# Patient Record
Sex: MALE | Race: WHITE | Employment: OTHER | URBAN - METROPOLITAN AREA
[De-identification: names, ages, dates, MRNs, and addresses within clinical notes are randomized per-mention and may not be internally consistent; named-entity substitution may affect disease eponyms.]

---

## 2020-01-15 ENCOUNTER — HOSPITAL ENCOUNTER (OUTPATIENT)
Dept: ULTRASOUND IMAGING | Age: 55
Discharge: HOME | End: 2020-01-15
Attending: NURSE PRACTITIONER
Payer: COMMERCIAL

## 2020-01-15 DIAGNOSIS — K40.90 INGUINAL HERNIA: ICD-10-CM

## 2020-01-15 PROCEDURE — 76705 ECHO EXAM OF ABDOMEN: CPT

## 2020-01-23 ENCOUNTER — OFFICE VISIT (OUTPATIENT)
Dept: SURGERY | Age: 55
End: 2020-01-23

## 2020-01-23 VITALS
OXYGEN SATURATION: 95 % | HEART RATE: 84 BPM | HEIGHT: 72 IN | BODY MASS INDEX: 24.52 KG/M2 | WEIGHT: 181 LBS | SYSTOLIC BLOOD PRESSURE: 118 MMHG | DIASTOLIC BLOOD PRESSURE: 76 MMHG | TEMPERATURE: 98.3 F

## 2020-01-23 DIAGNOSIS — K40.20 BILATERAL INGUINAL HERNIA WITHOUT OBSTRUCTION OR GANGRENE, RECURRENCE NOT SPECIFIED: Primary | ICD-10-CM

## 2020-01-23 RX ORDER — BISMUTH SUBSALICYLATE 262 MG
1 TABLET,CHEWABLE ORAL DAILY
COMMUNITY
End: 2021-04-04

## 2020-01-23 NOTE — PROGRESS NOTES
Chief Complaint   Patient presents with    Possible Hernia     Pt seen @ the request of Adriana Molina NP to evaluate possible VH. Discussed advanced directive. Patient states that he does not have an advanced directive.

## 2020-01-23 NOTE — PROGRESS NOTES
HISTORY OF PRESENT ILLNESS  Saima Pfeiffer is a 47 y.o. male. Pain for 3 years left groin  Dull    Had an US 1/15: report reads right femoral hernia, but I reviewed the pictures, looks like the left groin    Appetite good  BMs normal      ____________________________________________________________________________  Patient presents with:  Possible Hernia: Pt seen @ the request of Shay Savage NP to evaluate possible VH. /76   Pulse 84   Temp 98.3 °F (36.8 °C)   Ht 6' (1.829 m)   Wt 82.1 kg (181 lb)   SpO2 95%   BMI 24.55 kg/m²   History reviewed. No pertinent past medical history. Past Surgical History:  No date: HX HEENT      Comment:  tonsilectomy  Social History    Socioeconomic History      Marital status:       Spouse name: Not on file      Number of children: Not on file      Years of education: Not on file      Highest education level: Not on file    Tobacco Use      Smoking status: Never Smoker      Smokeless tobacco: Never Used    Substance and Sexual Activity      Alcohol use: Yes        Comment: social    Review of patient's family history indicates:  Problem: Cancer      Relation: Mother          Age of Onset: (Not Specified)    Current Outpatient Medications:  cholecalciferol, vitamin D3, (VITAMIN D3 PO), Take 5,000 Units by mouth daily. cetirizine HCl (ZYRTEC PO), Take  by mouth.  multivitamin (ONE A DAY) tablet, Take 1 Tab by mouth daily. No current facility-administered medications for this visit. Allergies: No Known Allergies  _____________________________________________________________________________            Possible Hernia   The history is provided by the patient. This is a new problem. The current episode started more than 1 week ago. The problem occurs constantly. The problem has been gradually worsening. Pertinent negatives include no chest pain, no abdominal pain, no headaches and no shortness of breath. The symptoms are aggravated by exertion.  The symptoms are relieved by rest. The treatment provided no relief. Review of Systems   Constitutional: Negative for chills, fever and weight loss. HENT: Negative for ear pain. Eyes: Negative for pain. Respiratory: Negative for shortness of breath. Cardiovascular: Negative for chest pain. Gastrointestinal: Negative for abdominal pain and blood in stool. Genitourinary: Negative for hematuria. Musculoskeletal: Negative for joint pain. Skin: Negative for rash. Neurological: Negative for dizziness, focal weakness, seizures and headaches. Endo/Heme/Allergies: Does not bruise/bleed easily. Psychiatric/Behavioral: The patient does not have insomnia. Physical Exam  Constitutional:       General: He is not in acute distress. Appearance: He is well-developed. He is not diaphoretic. HENT:      Head: Normocephalic and atraumatic. Mouth/Throat:      Pharynx: No oropharyngeal exudate. Eyes:      Pupils: Pupils are equal, round, and reactive to light. Neck:      Musculoskeletal: Normal range of motion. Trachea: No tracheal deviation. Cardiovascular:      Rate and Rhythm: Normal rate and regular rhythm. Heart sounds: Normal heart sounds. No murmur. Pulmonary:      Effort: Pulmonary effort is normal. No respiratory distress. Breath sounds: Normal breath sounds. No wheezing. Abdominal:      General: Bowel sounds are normal. There is no distension. Palpations: Abdomen is soft. There is no mass. Tenderness: There is no tenderness. There is no guarding or rebound. Hernia: A hernia is present. Hernia is present in the right inguinal area and left inguinal area. Genitourinary:     Comments: L>R  Musculoskeletal: Normal range of motion. General: No tenderness. Lymphadenopathy:      Cervical: No cervical adenopathy. Skin:     General: Skin is warm. Findings: No erythema or rash.    Neurological:      Mental Status: He is alert and oriented to person, place, and time. Psychiatric:         Behavior: Behavior normal.         ASSESSMENT and PLAN    ICD-10-CM ICD-9-CM    1. Bilateral inguinal hernia without obstruction or gangrene, recurrence not specified K40.20 550.92        Mio Maurer is having symptoms. I have recommended to him that we proceed with surgery. I had an extensive discussion with him regarding the risks, benefits, and alternatives of proceeding with a Laparoscopic Bilateral Inguinal Hernia Repair with Mesh, Robot Assisted. Risks,benefits, and alternatives were discussed including the risk of anesthesia, bleeding, infection, including mesh infection, chronic orchialgia, neuralgia, other pain syndromes, testicular ischemia, conversion to open, injury to bowel, and recurrence were discussed. He is in agreement to proceed. All questions were answered. Mio Maurer will undergo preoperative evaluation and will proceed provided he is medically stable. We will schedule him at his earliest convenience. Thank you for this consult.

## 2020-01-23 NOTE — LETTER
1/23/2020 2:37 PM 
 
Mr. Beryle North [address] Surgery Instruction Sheet You have been scheduled for surgery on 2/24/20 at 9:15am at [facility]. Please report to the Surgery Center at 7:15am, this is approximately 2 hours prior to your surgery time. The Surgery Center is located on the [address] [place] [address] side of the [facility], just next to the Emergency Room. Reserved parking is available and  parking if lot is full. You will need to have a Pre-op Visit prior to your surgery. Report to the Surgery Center on 2/18/20 at 2:00pm.  Bring a list of medications and your insurance cards with you. You may eat/drink prior to this visit. Call your physician immediately if you notice a change in your health between the time you saw your physician and the day of surgery. If you take a blood thinner, please let us know. Call your ordering Doctor to make sure you can stop taking it prior to your surgery. STOP YOUR ASPIRIN 5 DAYS PRIOR TO SURGERY. DO NOT TAKE  IBUPROFEN, ADVIL, MOTRIN, ALEVE, EXCEDRIN, BC POWDER, GOODIES, FISH OIL OR ANY MEDICATION CONTAINING ASPIRIN 5 DAYS PRIOR TO YOUR SURGERY. MAY TAKE TYLENOL. Eat a light dinner the evening before your surgery. DO NOT EAT OR DRINK ANYTHING AFTER MIDNIGHT THE NIGHT BEFORE YOUR SURGERY. This includes water, chewing gum, lifesavers, etc.  The Pre op nurse will check with you about any medication that you may need to take the morning of surgery. Shower with a new bar of anti-bacterial soap (Dial, Safeguard) or solution given to you by Pre-op, the night before surgery. Do not use lotion, powder, deodorant on the skin after showering.   Wear loose, comfortable clothing the day of surgery and bring a container to store your contacts, eyeglasses, dentures, hearing aid, etc.  Do not bring money, valuables, jewelry, etc. to the hospital.   
 
 If you are having outpatient surgery, someone must come with you the morning of surgery to drive you home. You can not drive for 24 hours after any anesthesia. Sometimes it is necessary to stay overnight and leave the next morning. This is still considered outpatient for most insurance deductibles. Someone will still need to drive you home. If you have questions or concerns, please feel free to call /Aretha at [phone]. If you need to cancel your surgery, please call as soon as possible. Sincerely, Lamond Krabbe, MD

## 2020-01-29 ENCOUNTER — TELEPHONE (OUTPATIENT)
Dept: SURGERY | Age: 55
End: 2020-01-29

## 2020-02-18 ENCOUNTER — HOSPITAL ENCOUNTER (OUTPATIENT)
Dept: PREADMISSION TESTING | Age: 55
Discharge: HOME | End: 2020-02-18
Payer: COMMERCIAL

## 2020-02-18 VITALS
OXYGEN SATURATION: 98 % | WEIGHT: 181.44 LBS | HEIGHT: 72 IN | RESPIRATION RATE: 16 BRPM | DIASTOLIC BLOOD PRESSURE: 79 MMHG | TEMPERATURE: 98.3 F | BODY MASS INDEX: 24.58 KG/M2 | SYSTOLIC BLOOD PRESSURE: 129 MMHG | HEART RATE: 64 BPM

## 2020-02-18 LAB
ANION GAP SERPL CALC-SCNC: 3 MMOL/L (ref 5–15)
ATRIAL RATE: 65 BPM
BUN SERPL-MCNC: 8 MG/DL (ref 6–20)
BUN/CREAT SERPL: 8 (ref 12–20)
CALCIUM SERPL-MCNC: 9.4 MG/DL (ref 8.5–10.1)
CALCULATED P AXIS, ECG09: 79 DEGREES
CALCULATED R AXIS, ECG10: 89 DEGREES
CALCULATED T AXIS, ECG11: 69 DEGREES
CHLORIDE SERPL-SCNC: 106 MMOL/L (ref 97–108)
CO2 SERPL-SCNC: 32 MMOL/L (ref 21–32)
CREAT SERPL-MCNC: 0.97 MG/DL (ref 0.7–1.3)
DIAGNOSIS, 93000: NORMAL
ERYTHROCYTE [DISTWIDTH] IN BLOOD BY AUTOMATED COUNT: 12.2 % (ref 11.5–14.5)
GLUCOSE SERPL-MCNC: 96 MG/DL (ref 65–100)
HCT VFR BLD AUTO: 45.5 % (ref 36.6–50.3)
HGB BLD-MCNC: 15.4 G/DL (ref 12.1–17)
MCH RBC QN AUTO: 32.2 PG (ref 26–34)
MCHC RBC AUTO-ENTMCNC: 33.8 G/DL (ref 30–36.5)
MCV RBC AUTO: 95 FL (ref 80–99)
NRBC # BLD: 0 K/UL (ref 0–0.01)
NRBC BLD-RTO: 0 PER 100 WBC
P-R INTERVAL, ECG05: 172 MS
PLATELET # BLD AUTO: 264 K/UL (ref 150–400)
PMV BLD AUTO: 10.5 FL (ref 8.9–12.9)
POTASSIUM SERPL-SCNC: 4.1 MMOL/L (ref 3.5–5.1)
Q-T INTERVAL, ECG07: 380 MS
QRS DURATION, ECG06: 94 MS
QTC CALCULATION (BEZET), ECG08: 395 MS
RBC # BLD AUTO: 4.79 M/UL (ref 4.1–5.7)
SODIUM SERPL-SCNC: 141 MMOL/L (ref 136–145)
VENTRICULAR RATE, ECG03: 65 BPM
WBC # BLD AUTO: 5 K/UL (ref 4.1–11.1)

## 2020-02-18 PROCEDURE — 80048 BASIC METABOLIC PNL TOTAL CA: CPT

## 2020-02-18 PROCEDURE — 36415 COLL VENOUS BLD VENIPUNCTURE: CPT

## 2020-02-18 PROCEDURE — 93005 ELECTROCARDIOGRAM TRACING: CPT

## 2020-02-18 PROCEDURE — 85027 COMPLETE CBC AUTOMATED: CPT

## 2020-02-18 RX ORDER — SODIUM CHLORIDE, SODIUM LACTATE, POTASSIUM CHLORIDE, CALCIUM CHLORIDE 600; 310; 30; 20 MG/100ML; MG/100ML; MG/100ML; MG/100ML
25 INJECTION, SOLUTION INTRAVENOUS ONCE
Status: CANCELLED | OUTPATIENT
Start: 2020-02-25 | End: 2020-02-25

## 2020-02-18 NOTE — PERIOP NOTES
[facility]  Preoperative Instructions        Surgery Date 2/25/2020   Time of Arrival 1000am  Contact# [phone]    1. On the day of your surgery, please report to the Surgical Services Registration Desk and sign in at your designated time. The Surgery Center is located to the right of the Emergency Room. 2. You must have someone with you to drive you home. You should not drive a car for 24 hours following surgery. Please make arrangements for a friend or family member to stay with you for the first 24 hours after your surgery. 3. Do not have anything to eat or drink (including water, gum, mints, coffee, juice) after midnight 2/24/2020  . ? This may not apply to medications prescribed by your physician. ?(Please note below the special instructions with medications to take the morning of your procedure.)    4. We recommend you do not drink any alcoholic beverages for 24 hours before and after your surgery. 5. Contact your surgeons office for instructions on the following medications: non-steroidal anti-inflammatory drugs (i.e. Advil, Aleve), vitamins, and supplements. (Some surgeons will want you to stop these medications prior to surgery and others may allow you to take them)  **If you are currently taking Plavix, Coumadin, Aspirin and/or other blood-thinning agents, contact your surgeon for instructions. ** Your surgeon will partner with the physician prescribing these medications to determine if it is safe to stop or if you need to continue taking. Please do not stop taking these medications without instructions from your surgeon    6. Wear comfortable clothes. Wear glasses instead of contacts. Do not bring any money or jewelry. Please bring picture ID, insurance card, and any prearranged co-payment or hospital payment. Do not wear make-up, particularly mascara the morning of your surgery. Do not wear nail polish, particularly if you are having foot /hand surgery.   Wear your hair loose or down, no ponytails, buns, sarai pins or clips. All body piercings must be removed. Please shower with antibacterial soap for three consecutive days before and on the morning of surgery, but do not apply any lotions, powders or deodorants after the shower on the day of surgery. Please use a fresh towels after each shower. Please sleep in clean clothes and change bed linens the night before surgery. Please do not shave for 48 hours prior to surgery. Shaving of the face is acceptable. 7. You should understand that if you do not follow these instructions your surgery may be cancelled. If your physical condition changes (I.e. fever, cold or flu) please contact your surgeon as soon as possible. 8. It is important that you be on time. If a situation occurs where you may be late, please call [phone] (OR Holding Area). 9. If you have any questions and or problems, please call [phone] (Pre-admission Testing). 10. Your surgery time may be subject to change. You will receive a phone call the evening prior if your time changes. 11.  If having outpatient surgery, you must have someone to drive you here, stay with you during the duration of your stay, and to drive you home at time of discharge. 12.   In an effort to improve the efficiency, privacy, and safety for all of our Pre-op patients visitors are not allowed in the Holding area. Once you arrive and are registered your family/visitors will be asked to remain in the waiting room. The Pre-op staff will get you from the Surgical Waiting Area and will explain to you and your family/visitors that the Pre-op phase is beginning. The staff will answer any questions and provide instructions for tracking of the patient, by use of the existing tracking number and color-coded status board in the waiting room.   At this time the staff will also ask for your designated spokesperson information in the event that the physician or staff need to provide an update or obtain any pertinent information. The designated spokesperson will be notified if the physician needs to speak to family during the pre-operative phase. If at any time your family/visitors has questions or concerns they may approach the volunteer desk in the waiting area for assistance. Special Instructions: none    TAKE ALL MEDICATIONS DAY OF SURGERY EXCEPT: vitamins/supplements      I understand a pre-operative phone call will be made to verify my surgery time. In the event that I am not available, I give permission for a message to be left on my answering service and/or with another person?   yes         ___________________      __________   _________    (Signature of Patient)             (Witness)                (Date and Time)

## 2020-02-25 ENCOUNTER — ANESTHESIA EVENT (OUTPATIENT)
Dept: SURGERY | Age: 55
End: 2020-02-25
Payer: COMMERCIAL

## 2020-02-25 ENCOUNTER — ANESTHESIA (OUTPATIENT)
Dept: SURGERY | Age: 55
End: 2020-02-25
Payer: COMMERCIAL

## 2020-02-25 ENCOUNTER — HOSPITAL ENCOUNTER (OUTPATIENT)
Age: 55
Setting detail: OUTPATIENT SURGERY
Discharge: HOME | End: 2020-02-25
Attending: SURGERY | Admitting: SURGERY
Payer: COMMERCIAL

## 2020-02-25 VITALS
TEMPERATURE: 97.8 F | WEIGHT: 176.37 LBS | HEART RATE: 78 BPM | OXYGEN SATURATION: 98 % | DIASTOLIC BLOOD PRESSURE: 65 MMHG | SYSTOLIC BLOOD PRESSURE: 105 MMHG | RESPIRATION RATE: 16 BRPM | BODY MASS INDEX: 23.89 KG/M2 | HEIGHT: 72 IN

## 2020-02-25 DIAGNOSIS — R52 PAIN: Primary | ICD-10-CM

## 2020-02-25 PROCEDURE — 74011250636 HC RX REV CODE- 250/636: Performed by: ANESTHESIOLOGY

## 2020-02-25 PROCEDURE — 77030013079 HC BLNKT BAIR HGGR 3M -A: Performed by: NURSE ANESTHETIST, CERTIFIED REGISTERED

## 2020-02-25 PROCEDURE — 74011000250 HC RX REV CODE- 250: Performed by: NURSE ANESTHETIST, CERTIFIED REGISTERED

## 2020-02-25 PROCEDURE — 77030008514 HC TBNG INSUF ENDO CNMD -B: Performed by: SURGERY

## 2020-02-25 PROCEDURE — 77030019908 HC STETH ESOPH SIMS -A: Performed by: NURSE ANESTHETIST, CERTIFIED REGISTERED

## 2020-02-25 PROCEDURE — 77030035277 HC OBTRTR BLDELSS DISP INTU -B: Performed by: SURGERY

## 2020-02-25 PROCEDURE — 76060000034 HC ANESTHESIA 1.5 TO 2 HR: Performed by: SURGERY

## 2020-02-25 PROCEDURE — 77030011640 HC PAD GRND REM COVD -A: Performed by: SURGERY

## 2020-02-25 PROCEDURE — 77030026438 HC STYL ET INTUB CARD -A: Performed by: NURSE ANESTHETIST, CERTIFIED REGISTERED

## 2020-02-25 PROCEDURE — 77030031139 HC SUT VCRL2 J&J -A: Performed by: SURGERY

## 2020-02-25 PROCEDURE — 77030020703 HC SEAL CANN DISP INTU -B: Performed by: SURGERY

## 2020-02-25 PROCEDURE — 77030002933 HC SUT MCRYL J&J -A: Performed by: SURGERY

## 2020-02-25 PROCEDURE — 77030002966 HC SUT PDS J&J -A: Performed by: SURGERY

## 2020-02-25 PROCEDURE — 77030016151 HC PROTCTR LNS DFOG COVD -B: Performed by: SURGERY

## 2020-02-25 PROCEDURE — 77030008684 HC TU ET CUF COVD -B: Performed by: NURSE ANESTHETIST, CERTIFIED REGISTERED

## 2020-02-25 PROCEDURE — 74011250637 HC RX REV CODE- 250/637: Performed by: SURGERY

## 2020-02-25 PROCEDURE — 74011250636 HC RX REV CODE- 250/636: Performed by: SURGERY

## 2020-02-25 PROCEDURE — 76010000875 HC OR TIME 1.5 TO 2HR INTENSV - TIER 2: Performed by: SURGERY

## 2020-02-25 PROCEDURE — 77030040361 HC SLV COMPR DVT MDII -B: Performed by: SURGERY

## 2020-02-25 PROCEDURE — 74011250636 HC RX REV CODE- 250/636: Performed by: NURSE ANESTHETIST, CERTIFIED REGISTERED

## 2020-02-25 PROCEDURE — 74011000250 HC RX REV CODE- 250: Performed by: SURGERY

## 2020-02-25 PROCEDURE — 77030040922 HC BLNKT HYPOTHRM STRY -A

## 2020-02-25 PROCEDURE — 74011250637 HC RX REV CODE- 250/637

## 2020-02-25 PROCEDURE — 76210000016 HC OR PH I REC 1 TO 1.5 HR: Performed by: SURGERY

## 2020-02-25 PROCEDURE — 77030038613 HC SUT PDS STRATA SPIRL J&J -B: Performed by: SURGERY

## 2020-02-25 PROCEDURE — 77030010507 HC ADH SKN DERMBND J&J -B: Performed by: SURGERY

## 2020-02-25 PROCEDURE — 77030008771 HC TU NG SALEM SUMP -A: Performed by: NURSE ANESTHETIST, CERTIFIED REGISTERED

## 2020-02-25 PROCEDURE — 77030018846 HC SOL IRR STRL H20 ICUM -A: Performed by: SURGERY

## 2020-02-25 PROCEDURE — C1781 MESH (IMPLANTABLE): HCPCS | Performed by: SURGERY

## 2020-02-25 PROCEDURE — 77030018673: Performed by: SURGERY

## 2020-02-25 PROCEDURE — 76210000026 HC REC RM PH II 1 TO 1.5 HR: Performed by: SURGERY

## 2020-02-25 DEVICE — MESH HERN W6XL6IN INGUINAL POLYPR SQ L PORE MFIL SFT KNIT: Type: IMPLANTABLE DEVICE | Site: INGUINAL | Status: FUNCTIONAL

## 2020-02-25 RX ORDER — KETOROLAC TROMETHAMINE 30 MG/ML
INJECTION, SOLUTION INTRAMUSCULAR; INTRAVENOUS AS NEEDED
Status: DISCONTINUED | OUTPATIENT
Start: 2020-02-25 | End: 2020-02-25 | Stop reason: HOSPADM

## 2020-02-25 RX ORDER — LIDOCAINE HYDROCHLORIDE 10 MG/ML
0.1 INJECTION, SOLUTION EPIDURAL; INFILTRATION; INTRACAUDAL; PERINEURAL AS NEEDED
Status: DISCONTINUED | OUTPATIENT
Start: 2020-02-25 | End: 2020-02-25 | Stop reason: HOSPADM

## 2020-02-25 RX ORDER — ONDANSETRON 2 MG/ML
INJECTION INTRAMUSCULAR; INTRAVENOUS
Status: DISCONTINUED
Start: 2020-02-25 | End: 2020-02-25 | Stop reason: WASHOUT

## 2020-02-25 RX ORDER — ROCURONIUM BROMIDE 10 MG/ML
INJECTION, SOLUTION INTRAVENOUS AS NEEDED
Status: DISCONTINUED | OUTPATIENT
Start: 2020-02-25 | End: 2020-02-25 | Stop reason: HOSPADM

## 2020-02-25 RX ORDER — POLYETHYLENE GLYCOL 3350 17 G/17G
17 POWDER, FOR SOLUTION ORAL DAILY
Status: SHIPPED | COMMUNITY
Start: 2020-02-25 | End: 2020-03-12

## 2020-02-25 RX ORDER — IBUPROFEN 600 MG/1
600 TABLET ORAL
Qty: 25 TAB | Refills: 1 | Status: SHIPPED | OUTPATIENT
Start: 2020-02-25 | End: 2020-03-01

## 2020-02-25 RX ORDER — OXYCODONE HYDROCHLORIDE 5 MG/1
5 TABLET ORAL ONCE
Status: COMPLETED | OUTPATIENT
Start: 2020-02-25 | End: 2020-02-25

## 2020-02-25 RX ORDER — ACETAMINOPHEN 500 MG
1000 TABLET ORAL 4 TIMES DAILY
Status: SHIPPED | COMMUNITY
Start: 2020-02-25 | End: 2021-04-04

## 2020-02-25 RX ORDER — DIPHENHYDRAMINE HYDROCHLORIDE 50 MG/ML
12.5 INJECTION, SOLUTION INTRAMUSCULAR; INTRAVENOUS AS NEEDED
Status: DISCONTINUED | OUTPATIENT
Start: 2020-02-25 | End: 2020-02-25 | Stop reason: HOSPADM

## 2020-02-25 RX ORDER — SODIUM CHLORIDE 0.9 % (FLUSH) 0.9 %
5-40 SYRINGE (ML) INJECTION AS NEEDED
Status: DISCONTINUED | OUTPATIENT
Start: 2020-02-25 | End: 2020-02-25 | Stop reason: HOSPADM

## 2020-02-25 RX ORDER — KETAMINE HYDROCHLORIDE 100 MG/ML
INJECTION, SOLUTION INTRAMUSCULAR; INTRAVENOUS AS NEEDED
Status: DISCONTINUED | OUTPATIENT
Start: 2020-02-25 | End: 2020-02-25 | Stop reason: HOSPADM

## 2020-02-25 RX ORDER — OXYCODONE HYDROCHLORIDE 5 MG/1
TABLET ORAL
Status: COMPLETED
Start: 2020-02-25 | End: 2020-02-25

## 2020-02-25 RX ORDER — MIDAZOLAM HYDROCHLORIDE 1 MG/ML
INJECTION, SOLUTION INTRAMUSCULAR; INTRAVENOUS AS NEEDED
Status: DISCONTINUED | OUTPATIENT
Start: 2020-02-25 | End: 2020-02-25 | Stop reason: HOSPADM

## 2020-02-25 RX ORDER — NEOSTIGMINE METHYLSULFATE 1 MG/ML
INJECTION INTRAVENOUS AS NEEDED
Status: DISCONTINUED | OUTPATIENT
Start: 2020-02-25 | End: 2020-02-25 | Stop reason: HOSPADM

## 2020-02-25 RX ORDER — FAMOTIDINE 10 MG/ML
INJECTION INTRAVENOUS AS NEEDED
Status: DISCONTINUED | OUTPATIENT
Start: 2020-02-25 | End: 2020-02-25 | Stop reason: HOSPADM

## 2020-02-25 RX ORDER — ONDANSETRON 4 MG/1
4 TABLET, ORALLY DISINTEGRATING ORAL
Qty: 8 TAB | Refills: 0 | Status: SHIPPED | OUTPATIENT
Start: 2020-02-25 | End: 2020-03-12

## 2020-02-25 RX ORDER — LIDOCAINE HYDROCHLORIDE 20 MG/ML
INJECTION, SOLUTION EPIDURAL; INFILTRATION; INTRACAUDAL; PERINEURAL AS NEEDED
Status: DISCONTINUED | OUTPATIENT
Start: 2020-02-25 | End: 2020-02-25 | Stop reason: HOSPADM

## 2020-02-25 RX ORDER — FAMOTIDINE 10 MG/ML
INJECTION INTRAVENOUS
Status: COMPLETED
Start: 2020-02-25 | End: 2020-02-25

## 2020-02-25 RX ORDER — SODIUM CHLORIDE, SODIUM LACTATE, POTASSIUM CHLORIDE, CALCIUM CHLORIDE 600; 310; 30; 20 MG/100ML; MG/100ML; MG/100ML; MG/100ML
25 INJECTION, SOLUTION INTRAVENOUS CONTINUOUS
Status: DISCONTINUED | OUTPATIENT
Start: 2020-02-25 | End: 2020-02-25 | Stop reason: HOSPADM

## 2020-02-25 RX ORDER — DEXAMETHASONE SODIUM PHOSPHATE 4 MG/ML
INJECTION, SOLUTION INTRA-ARTICULAR; INTRALESIONAL; INTRAMUSCULAR; INTRAVENOUS; SOFT TISSUE AS NEEDED
Status: DISCONTINUED | OUTPATIENT
Start: 2020-02-25 | End: 2020-02-25 | Stop reason: HOSPADM

## 2020-02-25 RX ORDER — BUPIVACAINE HYDROCHLORIDE AND EPINEPHRINE 5; 5 MG/ML; UG/ML
30 INJECTION, SOLUTION PERINEURAL ONCE
Status: COMPLETED | OUTPATIENT
Start: 2020-02-25 | End: 2020-02-25

## 2020-02-25 RX ORDER — FENTANYL CITRATE 50 UG/ML
INJECTION, SOLUTION INTRAMUSCULAR; INTRAVENOUS AS NEEDED
Status: DISCONTINUED | OUTPATIENT
Start: 2020-02-25 | End: 2020-02-25 | Stop reason: HOSPADM

## 2020-02-25 RX ORDER — OXYCODONE HYDROCHLORIDE 5 MG/1
5 TABLET ORAL
Qty: 15 TAB | Refills: 0 | Status: SHIPPED | OUTPATIENT
Start: 2020-02-25 | End: 2020-03-03

## 2020-02-25 RX ORDER — FENTANYL CITRATE 50 UG/ML
25 INJECTION, SOLUTION INTRAMUSCULAR; INTRAVENOUS
Status: DISCONTINUED | OUTPATIENT
Start: 2020-02-25 | End: 2020-02-25 | Stop reason: HOSPADM

## 2020-02-25 RX ORDER — ONDANSETRON 2 MG/ML
INJECTION INTRAMUSCULAR; INTRAVENOUS AS NEEDED
Status: DISCONTINUED | OUTPATIENT
Start: 2020-02-25 | End: 2020-02-25 | Stop reason: HOSPADM

## 2020-02-25 RX ORDER — ACETAMINOPHEN 10 MG/ML
INJECTION, SOLUTION INTRAVENOUS AS NEEDED
Status: DISCONTINUED | OUTPATIENT
Start: 2020-02-25 | End: 2020-02-25 | Stop reason: HOSPADM

## 2020-02-25 RX ORDER — SODIUM CHLORIDE 0.9 % (FLUSH) 0.9 %
5-40 SYRINGE (ML) INJECTION EVERY 8 HOURS
Status: DISCONTINUED | OUTPATIENT
Start: 2020-02-25 | End: 2020-02-25 | Stop reason: HOSPADM

## 2020-02-25 RX ORDER — GABAPENTIN 300 MG/1
300 CAPSULE ORAL 2 TIMES DAILY
Qty: 10 CAP | Refills: 0 | Status: SHIPPED | OUTPATIENT
Start: 2020-02-25 | End: 2020-03-01

## 2020-02-25 RX ORDER — PROPOFOL 10 MG/ML
INJECTION, EMULSION INTRAVENOUS AS NEEDED
Status: DISCONTINUED | OUTPATIENT
Start: 2020-02-25 | End: 2020-02-25 | Stop reason: HOSPADM

## 2020-02-25 RX ORDER — HYDROMORPHONE HYDROCHLORIDE 1 MG/ML
0.2 INJECTION, SOLUTION INTRAMUSCULAR; INTRAVENOUS; SUBCUTANEOUS
Status: DISCONTINUED | OUTPATIENT
Start: 2020-02-25 | End: 2020-02-25 | Stop reason: HOSPADM

## 2020-02-25 RX ORDER — SODIUM CHLORIDE, SODIUM LACTATE, POTASSIUM CHLORIDE, CALCIUM CHLORIDE 600; 310; 30; 20 MG/100ML; MG/100ML; MG/100ML; MG/100ML
25 INJECTION, SOLUTION INTRAVENOUS ONCE
Status: DISCONTINUED | OUTPATIENT
Start: 2020-02-25 | End: 2020-02-25 | Stop reason: HOSPADM

## 2020-02-25 RX ORDER — GLYCOPYRROLATE 0.2 MG/ML
INJECTION INTRAMUSCULAR; INTRAVENOUS AS NEEDED
Status: DISCONTINUED | OUTPATIENT
Start: 2020-02-25 | End: 2020-02-25 | Stop reason: HOSPADM

## 2020-02-25 RX ADMIN — FENTANYL CITRATE 25 MCG: 50 INJECTION, SOLUTION INTRAMUSCULAR; INTRAVENOUS at 13:04

## 2020-02-25 RX ADMIN — FENTANYL CITRATE 25 MCG: 50 INJECTION, SOLUTION INTRAMUSCULAR; INTRAVENOUS at 12:23

## 2020-02-25 RX ADMIN — SODIUM CHLORIDE, POTASSIUM CHLORIDE, SODIUM LACTATE AND CALCIUM CHLORIDE: 600; 310; 30; 20 INJECTION, SOLUTION INTRAVENOUS at 11:18

## 2020-02-25 RX ADMIN — PROPOFOL 200 MG: 10 INJECTION, EMULSION INTRAVENOUS at 10:21

## 2020-02-25 RX ADMIN — DEXAMETHASONE SODIUM PHOSPHATE 8 MG: 4 INJECTION, SOLUTION INTRAMUSCULAR; INTRAVENOUS at 10:46

## 2020-02-25 RX ADMIN — FENTANYL CITRATE 25 MCG: 50 INJECTION, SOLUTION INTRAMUSCULAR; INTRAVENOUS at 12:49

## 2020-02-25 RX ADMIN — LIDOCAINE HYDROCHLORIDE 100 MG: 20 INJECTION, SOLUTION INTRAVENOUS at 10:21

## 2020-02-25 RX ADMIN — MIDAZOLAM HYDROCHLORIDE 2 MG: 1 INJECTION INTRAMUSCULAR; INTRAVENOUS at 10:18

## 2020-02-25 RX ADMIN — NEOSTIGMINE METHYLSULFATE 3 MG: 1 INJECTION INTRAVENOUS at 11:37

## 2020-02-25 RX ADMIN — OXYCODONE HYDROCHLORIDE 5 MG: 5 TABLET ORAL at 13:24

## 2020-02-25 RX ADMIN — Medication 3 AMPULE: at 10:12

## 2020-02-25 RX ADMIN — OXYCODONE 5 MG: 5 TABLET ORAL at 13:24

## 2020-02-25 RX ADMIN — GLYCOPYRROLATE 0.6 MG: 0.2 INJECTION, SOLUTION INTRAMUSCULAR; INTRAVENOUS at 11:37

## 2020-02-25 RX ADMIN — ACETAMINOPHEN 1000 MG: 10 INJECTION, SOLUTION INTRAVENOUS at 10:59

## 2020-02-25 RX ADMIN — SODIUM CHLORIDE, SODIUM LACTATE, POTASSIUM CHLORIDE, AND CALCIUM CHLORIDE 25 ML/HR: 600; 310; 30; 20 INJECTION, SOLUTION INTRAVENOUS at 10:11

## 2020-02-25 RX ADMIN — MEPERIDINE HYDROCHLORIDE 12.5 MG: 25 INJECTION INTRAMUSCULAR; INTRAVENOUS; SUBCUTANEOUS at 12:35

## 2020-02-25 RX ADMIN — ONDANSETRON HYDROCHLORIDE 4 MG: 2 INJECTION, SOLUTION INTRAMUSCULAR; INTRAVENOUS at 11:37

## 2020-02-25 RX ADMIN — KETOROLAC TROMETHAMINE 30 MG: 30 INJECTION, SOLUTION INTRAMUSCULAR; INTRAVENOUS at 11:37

## 2020-02-25 RX ADMIN — ROCURONIUM BROMIDE 40 MG: 10 INJECTION INTRAVENOUS at 10:21

## 2020-02-25 RX ADMIN — MIDAZOLAM HYDROCHLORIDE 3 MG: 1 INJECTION INTRAMUSCULAR; INTRAVENOUS at 10:15

## 2020-02-25 RX ADMIN — FENTANYL CITRATE 25 MCG: 50 INJECTION, SOLUTION INTRAMUSCULAR; INTRAVENOUS at 12:16

## 2020-02-25 RX ADMIN — WATER 2 G: 1 INJECTION INTRAMUSCULAR; INTRAVENOUS; SUBCUTANEOUS at 10:32

## 2020-02-25 RX ADMIN — KETAMINE HYDROCHLORIDE 40 MG: 100 INJECTION, SOLUTION, CONCENTRATE INTRAMUSCULAR; INTRAVENOUS at 10:43

## 2020-02-25 RX ADMIN — FENTANYL CITRATE 100 MCG: 50 INJECTION, SOLUTION INTRAMUSCULAR; INTRAVENOUS at 10:21

## 2020-02-25 RX ADMIN — FAMOTIDINE 20 MG: 10 INJECTION INTRAVENOUS at 10:45

## 2020-02-25 NOTE — DISCHARGE INSTRUCTIONS
Dr. Jaqui Reina Discharge Instructions for:  Narendra Duong    MRN: [id number] : 1965    Admitted: 2020  Discharged: 2020       What to do at [address]: Resume your usual diet    Recommended activity: Lift less than 10 lbs for 4 weeks. Follow-up with Dr. Jose Ahumada in 2-3 weeks. Call [phone] for an appointment. Inguinal Hernia Repair: What to Expect at [address] are likely to have pain for the next few days. You may also feel like you have the flu, and you may have a low fever and feel tired and nauseated. This is common. You should feel better after a few days and will probably feel much better in 7 days. For several weeks you may feel twinges or pulling in the hernia repair when you move. You may have some bruising on the scrotum and along the penis. This is normal.    Men will need to wear a jockstrap or briefs, not boxers, for scrotal support for several days after a groin (inguinal) hernia repair. [employer] bicycle shorts may provide good support. This care sheet gives you a general idea about how long it will take for you to recover. But each person recovers at a different pace. Follow the steps below to get better as quickly as possible. How can you care for yourself at home? Activity  Rest when you feel tired. Getting enough sleep will help you recover. You can try to sleep with your head up in a recliner chair if that is more comfortable. Try to walk each day. Start by walking a little more than you did the day before. Bit by bit, increase the amount you walk. Walking boosts blood flow and helps prevent pneumonia and constipation. Put ice or a cold pack on the area of your hernia repair for 10 to 15 minutes at a time. Try to do this every 1 to 2 hours for the first 24 hours (when you are awake) or until the swelling goes down. Put a thin cloth between the ice and your skin.   Avoid strenuous activities, such as biking, jogging, weight lifting, or aerobic exercise, until your doctor says it is okay. Avoid lifting anything that would make you strain. This may include heavy grocery bags and milk containers, a heavy briefcase or backpack, cat litter or dog food bags, a child, or a vacuum . You may drive when you are no longer taking pain medicine and can quickly move your foot from the gas pedal to the brake. You must also be able to sit comfortably for a long period of time. Most people are able to return to work within 1 to 2 weeks after surgery. You may shower. Pat the cut (incision) dry. Do not take a bath for 2 weeks. You can have sex again when it feels comfortable to do so. Diet  You can eat your normal diet. If your stomach is upset, try bland, low-fat foods like plain rice, broiled chicken, toast, and yogurt. Drink plenty of fluids (unless your doctor tells you not to). You may notice that your bowel movements are not regular right after your surgery. This is common. Avoid constipation and straining with bowel movements. You may want to take a fiber supplement every day. If you have not had a bowel movement after a couple of days, ask your doctor about taking a mild laxative. Medicines  Take pain medicines exactly as directed. If the doctor gave you a prescription medicine for pain, take it as prescribed. If you are not taking a prescription pain medicine, take an over-the-counter medicine such as acetaminophen (Tylenol), ibuprofen (Advil, Motrin), or naproxen (Aleve). Read and follow all instructions on the label. Do not take two or more pain medicines at the same time unless the doctor told you to. Many pain medicines have acetaminophen, which is Tylenol. Too much acetaminophen (Tylenol) can be harmful. If your doctor prescribed antibiotics, take them as directed. Do not stop taking them just because you feel better. You need to take the full course of antibiotics.   If you think your pain medicine is making you sick to your stomach: Take your medicine after meals (unless your doctor has told you not to). Ask your doctor for a different pain medicine. Incision care  If you have strips of tape on the cut (incision) the doctor made, leave the tape on for a week or until it falls off. If you have staples closing the cut, you will need to visit your doctor in 1 to 2 weeks to have them removed. Wash the area daily with warm, soapy water and pat it dry. Follow-up care is a key part of your treatment and safety. Be sure to make and go to all appointments, and call your doctor if you are having problems. Its also a good idea to know your test results and keep a list of the medicines you take. When should you call for help? Call 911 anytime you think you may need emergency care. For example, call if:  You pass out (lose consciousness). You have sudden chest pain and shortness of breath, or you cough up blood. You have severe pain in your belly. Call your doctor now or seek immediate medical care if:  You are sick to your stomach and cannot keep fluids down. You have signs of a blood clot, such as:  Pain in your calf, back of the knee, thigh, or groin. Redness and swelling in your leg or groin. You have trouble passing urine or stool, especially if you have mild pain or swelling in your lower belly. You have hot flashes, sweating, flushing, or a fast or pounding heartbeat. Bright red blood has soaked through the bandage over your incision. Watch closely for any changes in your health, and be sure to contact your doctor if:  Your swelling is getting worse. Your swelling is not going down. TO PREVENT AN INFECTION      1. [address] YOUR HANDS     To prevent infection, good handwashing is the most important thing you or your caregiver can do.  Wash your hands with soap and water or use the hand  we gave you before you touch any wounds.     2. SHOWER     Use the antibacterial soap we gave you when you take a shower.  Shower with this soap until your wounds are healed.  To reach all areas of your body, you may need someone to help you.  Dont forget to clean your belly button with every shower. 3.  USE CLEAN SHEETS     Use freshly cleaned sheets on your bed after surgery.  To keep the surgery site clean, do not allow pets to sleep with you while your wound is still healing. 4. STOP SMOKING     Stop smoking, or at least cut back on smoking     Smoking slows your healing. 5.  CONTROL YOUR BLOOD SUGAR     High blood sugars slow wound healing. If you are diabetic, control your blood sugar levels before and after your surgery. How to Care for Your Wound After Its Treated With  DERMABOND* Topical Skin Adhesive  DERMABOND* Topical Skin Adhesive (2-octyl cyanoacrylate) is a sterile, liquid skin adhesive  that holds wound edges together. The film will usually remain in place for 5 to 10 days, then  naturally fall off your skin. The following will answer some of your questions and provide instructions for proper care for your  wound while it is healing:    CHECK WOUND APPEARANCE   Some swelling, redness, and pain are common with all wounds and normally will go away as the  wound heals. If swelling, redness, or pain increases or if the wound feels warm to the touch,  contact a doctor. Also contact a doctor if the wound edges reopen or separate. REPLACE BANDAGES   If your wound is bandaged, keep the bandage dry.  Replace the dressing daily until the adhesive film has fallen off or if the  bandage should become wet, unless otherwise instructed by your  physician.  When changing the dressing, do not place tape directly over the  DERMABOND adhesive film, because removing the tape later may also  remove the film.   AVOID TOPICAL MEDICATIONS   Do not apply liquid or ointment medications or any other product to your wound while the  DERMABOND adhesive film is in place. These may loosen the film before your wound is healed. KEEP WOUND DRY AND PROTECTED   You may occasionally and briefly wet your wound in the shower or bath. Do not soak or scrub  your wound, do not swim, and avoid periods of heavy perspiration until the DERMABOND  adhesive has naturally fallen off. After showering or bathing, gently blot your wound dry with a  soft towel. If a protective dressing is being used, apply a fresh, dry bandage, being sure to keep  the tape off the DERMABOND adhesive film.  Apply a clean, dry bandage over the wound if necessary to protect it.  Protect your wound from injury until the skin has had sufficient time to heal.   Do not scratch, rub, or pick at the DERMABOND adhesive film. This may loosen the film before  your wound is healed.  Protect the wound from prolonged exposure to sunlight or tanning lamps while the film is in  place. If you have any questions or concerns about this product, please consult your doctor. *Trademark ©[employer] 2002DISCHARGE SUMMARY from Nurse    The following personal items are in your possession at time of discharge:    Dental Appliances: None  Visual Aid: None  Home Medications: None  Jewelry: None  Clothing: None (left in in pt. room)  Other Valuables: None             PATIENT INSTRUCTIONS:    After general anesthesia or intravenous sedation, for 24 hours or while taking prescription Narcotics:  Limit your activities  Do not drive and operate hazardous machinery  Do not make important personal or business decisions  Do  not drink alcoholic beverages  If you have not urinated within 8 hours after discharge, please contact your surgeon on call.     Report the following to your surgeon:  Excessive pain, swelling, redness or odor of or around the surgical area  Temperature over 100.5  Nausea and vomiting lasting longer than 4 hours or if unable to take medications  Any signs of decreased circulation or nerve impairment to extremity: change in color, persistent  numbness, tingling, coldness or increase pain  Any questions    To prevent infection remember to refer to your handout on handwashing given to you by your nurse. *  Please give a list of your current medications to your Primary Care Provider. *  Please update this list whenever your medications are discontinued, doses are      changed, or new medications (including over-the-counter products) are added. *  Please carry medication information at all times in case of emergency situations. These are general instructions for a healthy lifestyle:    No smoking/ No tobacco products/ Avoid exposure to second hand smoke    Surgeon General's Warning:  Quitting smoking now greatly reduces serious risk to your health. Obesity, smoking, and sedentary lifestyle greatly increases your risk for illness    A healthy diet, regular physical exercise & weight monitoring are important for maintaining a healthy lifestyle    You may be retaining fluid if you have a history of heart failure or if you experience any of the following symptoms:  Weight gain of 3 pounds or more overnight or 5 pounds in a week, increased swelling in our hands or feet or shortness of breath while lying flat in bed. Please call your doctor as soon as you notice any of these symptoms; do not wait until your next office visit. Recognize signs and symptoms of STROKE:    F-face looks uneven    A-arms unable to move or move unevenly    S-speech slurred or non-existent    T-time-call 911 as soon as signs and symptoms begin-DO NOT go       Back to bed or wait to see if you get better-TIME IS BRAIN. The discharge information has been reviewed with the patient. The patient verbalized understanding. Patient Education      Oxycodone, Rapid Release (ETH-Oxydose, Oxy IR, Roxicodone) - (By mouth)   Why this medicine is used:   Treats moderate to severe pain. This medicine is a narcotic pain reliever.   Contact a nurse or doctor right away if you have: Fast or slow heart beat, shallow breathing, blue lips, skin or fingernails  Anxiety, restlessness, fever, sweating, muscle spasms, twitching, seeing or hearing things that are not there  Extreme weakness, shallow breathing, slow heartbeat  Severe confusion, lightheadedness, dizziness, fainting  Sweating or cold, clammy skin, seizures  Severe constipation, stomach pain, nausea, vomiting     Common side effects:  Mild constipation  Sleepiness, tiredness  © 2017 [facility] Information is for End User's use only and may not be sold, redistributed or otherwise used for commercial purposes.

## 2020-02-25 NOTE — INTERVAL H&P NOTE
H&P Update:  Katharina Lopes was seen and examined. Sites marked. History and physical has been reviewed. The patient has been examined.  There have been no significant clinical changes since the completion of the originally dated History and Physical.

## 2020-02-25 NOTE — ANESTHESIA PREPROCEDURE EVALUATION
Relevant Problems   No relevant active problems       Anesthetic History   No history of anesthetic complications            Review of Systems / Medical History  Patient summary reviewed, nursing notes reviewed and pertinent labs reviewed    Pulmonary  Within defined limits                 Neuro/Psych   Within defined limits           Cardiovascular  Within defined limits                Exercise tolerance: >4 METS     GI/Hepatic/Renal  Within defined limits              Endo/Other  Within defined limits           Other Findings   Comments: Bilateral inguinal hernia            Physical Exam    Airway  Mallampati: II  TM Distance: 4 - 6 cm  Neck ROM: normal range of motion   Mouth opening: Normal     Cardiovascular  Regular rate and rhythm,  S1 and S2 normal,  no murmur, click, rub, or gallop             Dental  No notable dental hx       Pulmonary  Breath sounds clear to auscultation               Abdominal  GI exam deferred       Other Findings            Anesthetic Plan    ASA: 1  Anesthesia type: general    Monitoring Plan: BIS      Induction: Intravenous  Anesthetic plan and risks discussed with: Patient

## 2020-02-25 NOTE — ANESTHESIA POSTPROCEDURE EVALUATION
Procedure(s):  ROBOTIC ASSISTED LAPAROSCOPIC BILATERAL INGUINAL HERNIA REPAIR WITH MESH. general    Anesthesia Post Evaluation        Patient location during evaluation: PACU  Note status: Adequate. Level of consciousness: responsive to verbal stimuli and sleepy but conscious  Pain management: satisfactory to patient  Airway patency: patent  Anesthetic complications: no  Cardiovascular status: acceptable  Respiratory status: acceptable  Hydration status: acceptable  Comments: +Post-Anesthesia Evaluation and Assessment    Patient: Elida Francisco MRN: [id number]  SSN: [id number]   YOB: 1965  Age: 47 y.o. Sex: male      Cardiovascular Function/Vital Signs    /76 (BP 1 Location: Right arm, BP Patient Position: At rest)   Pulse 83   Temp 36.8 °C (98.2 °F)   Resp 15   Ht 6' (1.829 m)   Wt 80 kg (176 lb 5.9 oz)   SpO2 99%   BMI 23.92 kg/m²     Patient is status post Procedure(s):  ROBOTIC ASSISTED LAPAROSCOPIC BILATERAL INGUINAL HERNIA REPAIR WITH MESH. Nausea/Vomiting: Controlled. Postoperative hydration reviewed and adequate. Pain:  Pain Scale 1: Numeric (0 - 10) (02/25/20 1300)  Pain Intensity 1: 4 (02/25/20 1300)   Managed. Neurological Status:   Neuro (WDL): Exceptions to WDL (02/25/20 1200)   At baseline. Mental Status and Level of Consciousness: Arousable. Pulmonary Status:   O2 Device: Room air (02/25/20 1300)   Adequate oxygenation and airway patent. Complications related to anesthesia: None    Post-anesthesia assessment completed. No concerns. Signed By: Karen Nielsen MD    2/25/2020  Post anesthesia nausea and vomiting:  controlled      Vitals Value Taken Time   /76 2/25/2020  1:00 PM   Temp 36.8 °C (98.2 °F) 2/25/2020 11:51 AM   Pulse 68 2/25/2020  1:12 PM   Resp 15 2/25/2020  1:12 PM   SpO2 99 % 2/25/2020  1:12 PM   Vitals shown include unvalidated device data.

## 2020-02-25 NOTE — H&P
Pre-Op History and Physical    Subjective:     Katharina Lopes is a 47 y.o. male who is here for Procedure(s):  ROBOTIC ASSISTED LAPAROSCOPIC BILATERAL INGUINAL HERNIA REPAIR WITH MESH    No past medical history on file. Past Surgical History:   Procedure Laterality Date    HX HEENT      tonsilectomy    HX HEENT      gum surgery     Family History   Problem Relation Age of Onset    Cancer Mother       Social History     Tobacco Use    Smoking status: Never Smoker    Smokeless tobacco: Never Used   Substance Use Topics    Alcohol use: Yes     Comment: social       Prior to Admission medications    Medication Sig Start Date End Date Taking? Authorizing Provider   cholecalciferol, vitamin D3, (VITAMIN D3 PO) Take 5,000 Units by mouth daily. Provider, Historical   cetirizine HCl (ZYRTEC PO) Take  by mouth. Provider, Historical   multivitamin (ONE A DAY) tablet Take 1 Tab by mouth daily. Provider, Historical     No Known Allergies     Review of Systems:  A comprehensive review of systems was negative except for that written in the History of Present Illness. Objective: Intake and Output:    No intake/output data recorded. No intake/output data recorded. Physical Exam:   Lungs: clear to auscultation bilaterally  Heart: regular rate and rhythm  Abdomen: soft, non-tender. No masses,  no organomegaly        Data Review:   No results found for this or any previous visit (from the past 24 hour(s)). Assessment:     Active Problems:    * No active hospital problems. *      Plan:     I had an extensive discussion with Katharina Lopes regarding the risks, benefits, and alternatives of proceeding with a  LAPAROSCOPIC BILATERAL INGUINAL HERNIA REPAIR WITH MESH, ROBOT ASSISTED.   Risks, benefits, and alternatives of surgery including the risk of anesthesia, bleeding, infection, injury to bowel, recurrence, chronic pain, and the lack of symptomatic improvement were discussed and Katharina Lopes is in agreement to proceed. Sites marked.         Signed By: Chacorta Barbosa MD     February 25, 2020

## 2020-02-25 NOTE — PROGRESS NOTES
Patient discharged to home. Iv removed. Discharge instructions, scripts, and medication education done with patient and spouse. Patient voided 200ml of urine prior to discharge.

## 2020-02-25 NOTE — OP NOTES
OPERATIVE REPORT  2/25/2020    PREOPERATIVE DIAGNOSES:    bilateral inguinal hernia. POSTOPERATIVE DIAGNOSES:   small RIGHT femoral and medium direct and  small  LEFT femoral and medium direct inguinal hernias. OPERATIVE PROCEDURE:  1. Laparoscopic repair of bilateral  Inguinal / femoral hernias with mesh, robot assisted. SURGEON:  Elaine Ruiz MD    OR STAFF: Circ-1: Nito Szymanski RN  Circ-2: Amberly Cortes  [facility]  Surg Asst-1: [facility]    ANESTHESIA:  General plus local.    Specimens: * No specimens in log *     COMPLICATIONS:  None. ESTIMATED BLOOD LOSS:  Minimal.       Event Time In Time Out   Incision Start 1045    Incision Close 1142         Implants:   Implant Name Type Inv. Item Serial No.  Lot No. LRB No. Used Action   MESH PAIGE SFT BARD 6X6IN --  - [id number]  [address] SFT BARD 6X6IN --  [id number] Brii [employer] [id number] 1 Implanted   MESH PAIGE SFT BARD 6X6IN --  - [id number]  MESH PAIGE SFT BARD 6X6IN --  [id number] Brii Black [id number] Right 1 Implanted       INDICATION FOR PROCEDURE: Xuan Cates is a 47 y.o. male presented to the office with groin pain. On exam, he was noted to have a hernia. He is brought to the operating room for repair. Risks, benefits and alternatives were discussed. Consent form was placed in the chart. DESCRIPTION OF PROCEDURE: The patient was brought to operating room number 1, with consent form signed and on the chart and the site of surgery marked. After satisfactory induction of general anesthesia, the patient was kept in the supine position, bilateral arms tucked to his sides, with appropriate padding. The patient's abdomen was prepped and draped sterilely, including use of Ioban.     After appropriate time-out was held, the skin was infused with local anesthetic, an incision was made on the left abdomen and an 8 mm optical entry trocar was placed intra-abdominally under visualization, and pneumoperitoneum was achieved. The abdomen was explored, with findings noted above. An 8 mm robotic port was placed on the right side and an 8 mm robotic port placed at the umbilicus. The patient was placed in Trendelenburg position, the robot was docked and robotic instruments were inserted. The abdomen was explored, with findings noted as above. The peritoneum was incised, and the peritoneal flap was created on the Left. This was carried down towards Nader's ligament medially and the abdominal side wall laterally. Appropriate landmarks were identified. The cord structures were dissected free of the peritoneum, and the peritoneum was taken down beyond the splaying of the cord. Once dissection was completed, a 10 cm x 15 cm mesh was inserted and set into position. It was secured with a 3-0 PDS suture on Nader's ligament and an additional suture to the medial anterior abdominal wall. It was noted to be lying appropriately and in good position. Hemostasis was ensured. A 3-0 barbed absorbable suture was used to reapproximate the peritoneum. Next, I turned my attention to the contralateral side. Repair was proceeded in a similar fashion with findings as above. There was a small femoral hernia with some scaring in the area of the femoral nerve. Care was taken to reduce the hernia and protect the surrounding vessels and nerves. Hemostasis was noted. Similar mesh was inserted and secured. It was noted to be laying appropriately and in a good position. Once completed, the ports were removed sequentially under visualization, and the pneumoperitoneum was allowed to escape. All skin incisions were closed with subcuticular Monocryl and Dermabond. The patient remained stable during my presence in the operating room.       Emanuel Corrigan MD

## 2020-02-25 NOTE — PERIOP NOTES
Handoff Report from Operating Room to PACU    Report received from GRICELDA Sharp RN and Eliel Faustin CRNA regarding Perfecto Caron. Surgeon(s):  Nada Lefort, MD  And Procedure(s) (LRB):  ROBOTIC ASSISTED LAPAROSCOPIC BILATERAL INGUINAL HERNIA REPAIR WITH MESH (Bilateral)  confirmed   with allergies and dressings discussed. Anesthesia type, drugs, patient history, complications, estimated blood loss, vital signs, intake and output, and last pain medication, lines, reversal medications and temperature were reviewed.

## 2020-03-12 ENCOUNTER — OFFICE VISIT (OUTPATIENT)
Dept: SURGERY | Age: 55
End: 2020-03-12

## 2020-03-12 VITALS
TEMPERATURE: 97.4 F | DIASTOLIC BLOOD PRESSURE: 93 MMHG | RESPIRATION RATE: 16 BRPM | HEIGHT: 72 IN | HEART RATE: 65 BPM | WEIGHT: 181.7 LBS | SYSTOLIC BLOOD PRESSURE: 129 MMHG | BODY MASS INDEX: 24.61 KG/M2 | OXYGEN SATURATION: 98 %

## 2020-03-12 DIAGNOSIS — Z09 POSTOPERATIVE EXAMINATION: Primary | ICD-10-CM

## 2020-03-12 NOTE — PROGRESS NOTES
Chief Complaint   Patient presents with    Surgical Follow-up     Post/op pedro MAXWELL with mesh 2/24/20     Didn't take the miralax  Had some constipation  BMs normal now    Tolerating PO  Taking no pain meds      Physical Exam:   Abdominal exam: soft  non-distended  appropriatly tender.   Wound: clean, dry, no drainage    Doing well  Continue restricted activity for a total of 4 weeks  Follow-up: juan antonio Morgan MD FACS

## 2020-03-12 NOTE — PROGRESS NOTES
Chief Complaint   Patient presents with    Surgical Follow-up     Post/op anaaddison MAXWELL with mesh 2/24/20       1. Have you been to the ER, urgent care clinic since your last visit? No  Hospitalized since your last visit? no    2. Have you seen or consulted any other health care providers outside of the [facility] since your last visit? no Include any pap smears or colon screening.

## 2021-04-04 ENCOUNTER — APPOINTMENT (OUTPATIENT)
Dept: GENERAL RADIOLOGY | Age: 56
End: 2021-04-04
Attending: EMERGENCY MEDICINE
Payer: COMMERCIAL

## 2021-04-04 ENCOUNTER — HOSPITAL ENCOUNTER (EMERGENCY)
Age: 56
Discharge: HOME | End: 2021-04-05
Attending: EMERGENCY MEDICINE
Payer: COMMERCIAL

## 2021-04-04 VITALS
RESPIRATION RATE: 18 BRPM | SYSTOLIC BLOOD PRESSURE: 146 MMHG | DIASTOLIC BLOOD PRESSURE: 87 MMHG | HEIGHT: 72 IN | OXYGEN SATURATION: 97 % | HEART RATE: 54 BPM | TEMPERATURE: 97.7 F | WEIGHT: 184.3 LBS | BODY MASS INDEX: 24.96 KG/M2

## 2021-04-04 DIAGNOSIS — S61.212A LACERATION OF RIGHT MIDDLE FINGER WITHOUT FOREIGN BODY WITHOUT DAMAGE TO NAIL, INITIAL ENCOUNTER: Primary | ICD-10-CM

## 2021-04-04 PROCEDURE — 90715 TDAP VACCINE 7 YRS/> IM: CPT | Performed by: EMERGENCY MEDICINE

## 2021-04-04 PROCEDURE — 75810000293 HC SIMP/SUPERF WND  RPR

## 2021-04-04 PROCEDURE — 90471 IMMUNIZATION ADMIN: CPT

## 2021-04-04 PROCEDURE — 74011250636 HC RX REV CODE- 250/636: Performed by: EMERGENCY MEDICINE

## 2021-04-04 PROCEDURE — 73130 X-RAY EXAM OF HAND: CPT

## 2021-04-04 PROCEDURE — 99282 EMERGENCY DEPT VISIT SF MDM: CPT

## 2021-04-04 RX ORDER — BUPIVACAINE HYDROCHLORIDE 5 MG/ML
5 INJECTION, SOLUTION EPIDURAL; INTRACAUDAL
Status: COMPLETED | OUTPATIENT
Start: 2021-04-04 | End: 2021-04-05

## 2021-04-04 RX ORDER — LIDOCAINE HYDROCHLORIDE 10 MG/ML
5 INJECTION, SOLUTION EPIDURAL; INFILTRATION; INTRACAUDAL; PERINEURAL ONCE
Status: COMPLETED | OUTPATIENT
Start: 2021-04-04 | End: 2021-04-05

## 2021-04-04 RX ADMIN — TETANUS TOXOID, REDUCED DIPHTHERIA TOXOID AND ACELLULAR PERTUSSIS VACCINE, ADSORBED 0.5 ML: 5; 2.5; 8; 8; 2.5 SUSPENSION INTRAMUSCULAR at 23:08

## 2021-04-05 PROCEDURE — 74011000250 HC RX REV CODE- 250: Performed by: EMERGENCY MEDICINE

## 2021-04-05 RX ADMIN — BUPIVACAINE HYDROCHLORIDE 25 MG: 5 INJECTION, SOLUTION EPIDURAL; INTRACAUDAL; PERINEURAL at 00:12

## 2021-04-05 RX ADMIN — LIDOCAINE HYDROCHLORIDE 5 ML: 10 INJECTION, SOLUTION EPIDURAL; INFILTRATION; INTRACAUDAL; PERINEURAL at 00:12

## 2021-04-05 NOTE — ED PROVIDER NOTES
EMERGENCY DEPARTMENT HISTORY AND PHYSICAL EXAM      Date: 4/4/2021  Patient Name: Ava Cunha    History of Presenting Illness     Chief Complaint   Patient presents with    Laceration     Pt CC of right hand lac from a table saw. Pt was sent here from [address] [place] [address] wound is wrapped by [facility]. Pt last unsure of last tetanis shot. History Provided By: Patient    HPI: Ava Cunha, 54 y.o. male presents to the ED with cc of hand injury. Pt was at home this evening using a table saw. While working the table saw came into contact with his right hand. This resulted in many very superifical linear cut to digits 3/4/5. As well as a laceration to the 3rd digit on the right hand. He states there is some mild pain associated with it. There were no other injuries. Last tetanus unknown. There are no other complaints, changes, or physical findings at this time. PCP: Brayan Truong NP    No current facility-administered medications on file prior to encounter. Current Outpatient Medications on File Prior to Encounter   Medication Sig Dispense Refill    [DISCONTINUED] acetaminophen (TYLENOL EXTRA STRENGTH) 500 mg tablet Take 2 Tabs by mouth four (4) times daily.  [DISCONTINUED] cholecalciferol, vitamin D3, (VITAMIN D3 PO) Take 5,000 Units by mouth daily.  [DISCONTINUED] cetirizine HCl (ZYRTEC PO) Take  by mouth.  [DISCONTINUED] multivitamin (ONE A DAY) tablet Take 1 Tab by mouth daily. Past History     Past Medical History:  History reviewed. No pertinent past medical history.     Past Surgical History:  Past Surgical History:   Procedure Laterality Date    HX HEENT      tonsilectomy    HX HEENT      gum surgery    HX HERNIA REPAIR Bilateral 02/24/2020    HX OTHER SURGICAL      vascetomy          Family History:  Family History   Problem Relation Age of Onset    Cancer Mother        Social History:  Social History     Tobacco Use    Smoking status: Never Smoker    Smokeless tobacco: Never Used   Substance Use Topics    Alcohol use: Yes     Comment: social    Drug use: Yes     Types: Prescription, OTC       Allergies:  No Known Allergies      Review of Systems   Review of Systems   Constitutional: Negative. HENT: Negative. Respiratory: Negative. Cardiovascular: Negative. Musculoskeletal:        Pain of fiingers of right hand    Skin: Positive for wound (cuts to the right hand). Neurological: Negative for weakness and numbness. Hematological: Negative. Does not bruise/bleed easily. Psychiatric/Behavioral: Negative. Physical Exam   Physical Exam  Vitals signs and nursing note reviewed. Constitutional:       General: He is not in acute distress. Appearance: He is well-developed. He is not diaphoretic. HENT:      Head: Normocephalic and atraumatic. Mouth/Throat:      Mouth: Mucous membranes are moist.      Pharynx: No oropharyngeal exudate. Eyes:      Extraocular Movements: Extraocular movements intact. Conjunctiva/sclera: Conjunctivae normal.      Pupils: Pupils are equal, round, and reactive to light. Neck:      Vascular: No JVD. Trachea: No tracheal deviation. Cardiovascular:      Rate and Rhythm: Normal rate and regular rhythm. Heart sounds: Normal heart sounds. No murmur. Pulmonary:      Effort: Pulmonary effort is normal. No respiratory distress. Musculoskeletal: Normal range of motion. Hands:    Skin:     General: Skin is warm and dry. Capillary Refill: Capillary refill takes less than 2 seconds. Comments: Finger laceration and abrasion   Neurological:      Mental Status: He is alert and oriented to person, place, and time. Cranial Nerves: No cranial nerve deficit.       Comments: No gross motor or sensory deficits    Psychiatric:         Mood and Affect: Mood normal.         Behavior: Behavior normal.         Diagnostic Study Results     Labs -  None    Radiologic Studies - XR HAND RT MIN 3 V   Final Result   No evidence of fracture or foreign body. CT Results  (Last 48 hours)    None        CXR Results  (Last 48 hours)    None          Medical Decision Making   I am the first provider for this patient. I reviewed the vital signs, available nursing notes, past medical history, past surgical history, family history and social history. Vital Signs-Reviewed the patient's vital signs. Patient Vitals for the past 12 hrs:   Temp Pulse Resp BP SpO2   04/04/21 2204 97.7 °F (36.5 °C) (!) 54 18 (!) 146/87 97 %       Records Reviewed: Nursing Notes and Old Medical Records    Provider Notes (Medical Decision Making):   DDx- finger lac, finger fx, tendon injury     ED Course:   Initial assessment performed. The patients presenting problems have been discussed, and they are in agreement with the care plan formulated and outlined with them. I have encouraged them to ask questions as they arise throughout their visit. Tetanus unknown, updated here in the ED, Sy Davila DO    Procedure Note - Digital Block:   11:11 PM  Performed by: Sy Davila  Lidocaine 1% w/o, Marcaine 0.5% w/o, total 3ml to right 3rd digit used to perform digital block. The procedure took 2 minutes, and pt tolerated well. Procedure Note - Laceration Repair:  11:11 PM  Procedure by Sy Davila DO. Complexity: Simple   2cm linear laceration to right middle finger  was irrigated copiously with NS under jet lavage, prepped with Hibiclens and draped in a sterile fashion. The area was anesthetized with nerve block The wound was explored with the following results: No tendon laceration seen. The wound was repaired with One layer suture closure: Skin Layer:  7 sutures placed, stitch type:simple interrupted, suture: 5-0 nylon. .  The wound was closed with good hemostasis and approximation. Sterile dressing applied. Estimated blood loss: 0  The procedure took 15 minutes, tolerated well.  Sterile non stick dressing placed. Prior to laceration repair, flex/extension tendons tested at the DIP and PIP, MCP joints with resistance. There was no loss of movement or strength. Disposition:  DC home sutures out 7-10 days     DISCHARGE PLAN:  1. There are no discharge medications for this patient. 2.   Follow-up Information     Follow up With Specialties Details Why Contact Info    Curtis Goddard NP Nurse Practitioner   [address] [place] [address]  [phone]          3. Return to ED if worse     Diagnosis     Clinical Impression:   1. Laceration of right middle finger without foreign body without damage to nail, initial encounter    . Attestations:    Snehal Hodge, DO    Please note that this dictation was completed with [employer], the computer voice recognition software. Quite often unanticipated grammatical, syntax, homophones, and other interpretive errors are inadvertently transcribed by the computer software. Please disregard these errors. Please excuse any errors that have escaped final proofreading. Thank you.

## 2021-04-05 NOTE — ED NOTES
Bedside and Verbal shift change report given to Elizabeth Lowery RN (oncoming nurse) by Ayanna Wiley RN (offgoing nurse). Report included the following information SBAR, ED Summary, MAR and Recent Results.

## 2021-04-05 NOTE — ED NOTES
Pt discharged by Sally Rosario. Pt provided with discharge instructions RX and instructions on follow up care. Pt ambulated out of ED with no apparent difficulty accompanied by RN.

## 2021-04-05 NOTE — ED NOTES
Bedside and Verbal shift change report given to Gilford Peck (oncoming nurse) by Alana Szymanski (offgoing nurse). Report included the following information SBAR and ED Summary.

## (undated) DEVICE — SUTURE PDS II SZ 3-0 L27IN ABSRB VLT L26MM SH 1/2 CIR Z316H

## (undated) DEVICE — SUTURE VCRL SZ 3-0 L27IN ABSRB UD L26MM SH 1/2 CIR J416H

## (undated) DEVICE — STRAP,POSITIONING,KNEE/BODY,FOAM,4X60": Brand: MEDLINE

## (undated) DEVICE — TOWEL SURG W17XL27IN STD BLU COT NONFENESTRATED PREWASHED

## (undated) DEVICE — SURGICAL PROCEDURE KIT GEN LAPAROSCOPY LF

## (undated) DEVICE — NEEDLE HYPO 22GA L1.5IN BLK S STL HUB POLYPR SHLD REG BVL

## (undated) DEVICE — ARM DRAPE

## (undated) DEVICE — GARMENT,MEDLINE,DVT,INT,CALF,MED, GEN2: Brand: MEDLINE

## (undated) DEVICE — REM POLYHESIVE ADULT PATIENT RETURN ELECTRODE: Brand: VALLEYLAB

## (undated) DEVICE — 3M™ IOBAN™ 2 ANTIMICROBIAL INCISE DRAPE 6651EZ: Brand: IOBAN™ 2

## (undated) DEVICE — TIP COVER ACCESSORY

## (undated) DEVICE — SEAL UNIV 5-8MM DISP BX/10 -- DA VINCI XI - SNGL USE

## (undated) DEVICE — SOLUTION IRRIG 1000ML H2O STRL BLT

## (undated) DEVICE — DERMABOND SKIN ADH 0.7ML -- DERMABOND ADVANCED 12/BX

## (undated) DEVICE — VISUALIZATION SYSTEM: Brand: CLEARIFY

## (undated) DEVICE — DRAPE,REIN 53X77,STERILE: Brand: MEDLINE

## (undated) DEVICE — INFECTION CONTROL KIT SYS

## (undated) DEVICE — COVER,MAYO STAND,STERILE: Brand: MEDLINE

## (undated) DEVICE — SUTURE MCRYL SZ 4-0 L27IN ABSRB UD L19MM PS-2 1/2 CIR PRIM Y426H

## (undated) DEVICE — SUT STRATA PDS+ 15CM SZ 3-0 SH -- STRATAFIX

## (undated) DEVICE — HANDLE LT SNAP ON ULT DURABLE LENS FOR TRUMPF ALC DISPOSABLE

## (undated) DEVICE — (D)PREP SKN CHLRAPRP APPL 26ML -- CONVERT TO ITEM 371833

## (undated) DEVICE — AIRSEAL SINGLE LUMEN FILTERED TUBE SET: Brand: AIRSEAL

## (undated) DEVICE — BLADELESS OBTURATOR: Brand: WECK VISTA